# Patient Record
Sex: MALE | Race: OTHER | NOT HISPANIC OR LATINO | URBAN - METROPOLITAN AREA
[De-identification: names, ages, dates, MRNs, and addresses within clinical notes are randomized per-mention and may not be internally consistent; named-entity substitution may affect disease eponyms.]

---

## 2020-02-23 ENCOUNTER — EMERGENCY (EMERGENCY)
Facility: HOSPITAL | Age: 30
LOS: 0 days | Discharge: HOME | End: 2020-02-23
Attending: STUDENT IN AN ORGANIZED HEALTH CARE EDUCATION/TRAINING PROGRAM | Admitting: STUDENT IN AN ORGANIZED HEALTH CARE EDUCATION/TRAINING PROGRAM
Payer: SUBSIDIZED

## 2020-02-23 VITALS — HEART RATE: 98 BPM

## 2020-02-23 VITALS
HEART RATE: 107 BPM | SYSTOLIC BLOOD PRESSURE: 132 MMHG | TEMPERATURE: 97 F | OXYGEN SATURATION: 97 % | WEIGHT: 179.9 LBS | RESPIRATION RATE: 16 BRPM | DIASTOLIC BLOOD PRESSURE: 87 MMHG

## 2020-02-23 DIAGNOSIS — F10.99 ALCOHOL USE, UNSPECIFIED WITH UNSPECIFIED ALCOHOL-INDUCED DISORDER: ICD-10-CM

## 2020-02-23 DIAGNOSIS — F10.120 ALCOHOL ABUSE WITH INTOXICATION, UNCOMPLICATED: ICD-10-CM

## 2020-02-23 PROCEDURE — 99283 EMERGENCY DEPT VISIT LOW MDM: CPT

## 2020-02-23 PROCEDURE — 99053 MED SERV 10PM-8AM 24 HR FAC: CPT

## 2020-02-23 NOTE — ED ADULT NURSE NOTE - CHIEF COMPLAINT QUOTE
pt BIB EMS after "being in a bar in [place], went to go home and wound up in [place]"  pt offers no complaints at this time

## 2020-02-23 NOTE — ED PROVIDER NOTE - PATIENT PORTAL LINK FT
You can access the FollowMyHealth Patient Portal offered by [facility] by registering at the following website: [email or web address]. By joining [facility]’s FollowMyHealth portal, you will also be able to view your health information using other applications (apps) compatible with our system.

## 2020-02-23 NOTE — ED PROVIDER NOTE - PROGRESS NOTE DETAILS
pt seen and examined; clinically sober; brought to [place] by police for wandering street; pt states he just wanted instructions to go home; pt will take uber/bus home to [place]

## 2020-02-23 NOTE — ED PROVIDER NOTE - NS ED ROS FT
Constitutional: no fever  Cardiovascular: no chest pain  Respiratory:  no cough  Abdominal: no abdominal pain  Neurological: no altered mental status

## 2020-02-23 NOTE — ED ADULT TRIAGE NOTE - CHIEF COMPLAINT QUOTE
pt BIB EMS after "being in a bar in [place], went to go home and wound up in [place]"  pt offers no complaints at this time

## 2020-02-23 NOTE — ED PROVIDER NOTE - PHYSICAL EXAMINATION
PHYSICAL EXAM:    Constitutional: awake, alert, NAD  Eyes: EOMI, no conj injection  HENT: NC AT  Respiratory: no respiratory distress, breath sounds equal b/l, no wheezing, rhonchi or stridor.   Cardiovascular: RRR nml S1S2  Gastrointestinal: soft, no masses, nontender, nondistended. No guarding or rebound.   Neurological: AAOx3, CN II-XII grossly intact, no focal numbness or weakness, gait steady  Skin: no rash  Musculoskeletal: no gross deformity

## 2020-02-23 NOTE — ED PROVIDER NOTE - OBJECTIVE STATEMENT
30 yo m no pmh here for intox. pt was drinking in [place] and got lost en route to [place]. pt was brought in as he appeared intox to . pt denies complaints, no head/neck injury. no drug use. no si/hi

## 2020-02-23 NOTE — ED ADULT NURSE NOTE - OBJECTIVE STATEMENT
pt is a 30 yo male, calm cooperative, denies any complaints, wants to go home, steady gaits, clear speech.